# Patient Record
Sex: MALE | Race: WHITE | NOT HISPANIC OR LATINO | URBAN - METROPOLITAN AREA
[De-identification: names, ages, dates, MRNs, and addresses within clinical notes are randomized per-mention and may not be internally consistent; named-entity substitution may affect disease eponyms.]

---

## 2025-04-12 ENCOUNTER — OFFICE VISIT (OUTPATIENT)
Dept: URGENT CARE | Age: 25
End: 2025-04-12

## 2025-04-12 VITALS
DIASTOLIC BLOOD PRESSURE: 71 MMHG | HEART RATE: 71 BPM | BODY MASS INDEX: 31.01 KG/M2 | OXYGEN SATURATION: 98 % | HEIGHT: 73 IN | SYSTOLIC BLOOD PRESSURE: 103 MMHG | TEMPERATURE: 98.2 F | RESPIRATION RATE: 18 BRPM | WEIGHT: 234 LBS

## 2025-04-12 DIAGNOSIS — S05.02XA ABRASION OF LEFT CORNEA, INITIAL ENCOUNTER: Primary | ICD-10-CM

## 2025-04-12 RX ORDER — CIPROFLOXACIN HYDROCHLORIDE 3 MG/ML
2 SOLUTION/ DROPS OPHTHALMIC EVERY 4 HOURS
Qty: 5 ML | Refills: 0 | Status: SHIPPED | OUTPATIENT
Start: 2025-04-12 | End: 2025-04-19

## 2025-04-12 ASSESSMENT — ENCOUNTER SYMPTOMS
PHOTOPHOBIA: 0
FEVER: 0
EYE DISCHARGE: 0
CHILLS: 0
EYE REDNESS: 1

## 2025-04-12 ASSESSMENT — PATIENT HEALTH QUESTIONNAIRE - PHQ9
SUM OF ALL RESPONSES TO PHQ9 QUESTIONS 1 & 2: 0
2. FEELING DOWN, DEPRESSED OR HOPELESS: NOT AT ALL
1. LITTLE INTEREST OR PLEASURE IN DOING THINGS: NOT AT ALL

## 2025-04-12 NOTE — PATIENT INSTRUCTIONS
You have a corneal abrasion.   Please use antibiotics as prescribed.   Please schedule follow up with your eye doctor within 72 hours.   Do not wear contacts until follow up with eye doctor.   You should wear sunglasses when out in the sun.

## 2025-04-12 NOTE — PROGRESS NOTES
"Subjective   Patient ID: Yesika Coy is a 24 y.o. male. They present today with a chief complaint of Eye Problem (Left eye /\"Patient states a bug flew in to eye and he took it out, but wants to get checked to make sure no infected or if something is still in there. Eye was red yesterday\"/Happened yesterday ).    History of Present Illness  -c/o left eye irritation and discomfort x 1 day  -yesterday he put in a new contact and had some irritation   -contact felt dry throughout the day, and he adjusted it numerous times  -later bug flew into his eye but he was able to get it out  -eye was blood shot yesterday, but less today  -eye is still irritated and feels like it has something in it  -he did not put his contacts in today  -denies changes in vision, blurry vision, eye drainage, double vision         Eye Problem  Associated symptoms: redness    Associated symptoms: no discharge and no photophobia        Past Medical History  Allergies as of 04/12/2025    (No Known Allergies)       (Not in a hospital admission)       Past Medical History:   Diagnosis Date    Acute upper respiratory infection, unspecified 06/16/2018    Acute upper respiratory infection    Acute upper respiratory infection, unspecified 06/16/2018    Acute upper respiratory infection    Acute upper respiratory infection, unspecified 01/14/2018    Viral upper respiratory tract infection    Other specified symptoms and signs involving the circulatory and respiratory systems 04/26/2019    Globus sensation    Personal history of other diseases of the nervous system and sense organs 09/24/2016    History of tension headache    Personal history of other diseases of the respiratory system 03/31/2017    History of streptococcal pharyngitis    Personal history of other diseases of the respiratory system 05/29/2019    History of common cold    Personal history of other diseases of the respiratory system 05/29/2019    History of acute pharyngitis    Personal " "history of other diseases of the respiratory system 11/06/2018    History of sore throat    Personal history of other specified conditions 01/14/2018    History of fever       History reviewed. No pertinent surgical history.     reports that he has never smoked. He has never been exposed to tobacco smoke. He has never used smokeless tobacco.    Review of Systems  Review of Systems   Constitutional:  Negative for chills and fever.   Eyes:  Positive for redness. Negative for photophobia, discharge and visual disturbance.        SEE HPI   All other systems reviewed and are negative.        Objective    Vitals:    04/12/25 1136   BP: 103/71   BP Location: Left arm   Patient Position: Sitting   Pulse: 71   Resp: 18   Temp: 36.8 °C (98.2 °F)   SpO2: 98%   Weight: 106 kg (234 lb)   Height: 1.854 m (6' 1\")     No LMP for male patient.    Physical Exam  Vitals reviewed.   Constitutional:       General: He is not in acute distress.     Appearance: Normal appearance. He is not ill-appearing.   HENT:      Head: Normocephalic and atraumatic.   Eyes:      General: Lids are normal. Lids are everted, no foreign bodies appreciated. No allergic shiner.        Left eye: No foreign body, discharge or hordeolum.      Extraocular Movements: Extraocular movements intact.      Left eye: No nystagmus.      Conjunctiva/sclera:      Left eye: Left conjunctiva is injected. No chemosis, exudate or hemorrhage.     Pupils: Pupils are equal, round, and reactive to light.      Left eye: Corneal abrasion and fluorescein uptake present.      Slit lamp exam:     Left eye: No corneal ulcer or foreign body.     Skin:     General: Skin is warm and dry.      Findings: No rash.   Neurological:      Mental Status: He is alert.         Ocular Foreign Body Removal    Date/Time: 4/12/2025 12:01 PM    Performed by: Lupe Mckeon PA-C  Authorized by: Lupe Mckeon PA-C    Consent:     Consent obtained:  Verbal    Consent given by:  Patient    Risks, " benefits, and alternatives were discussed: yes      Risks discussed:  Pain, worsening of condition, globe perforation, incomplete removal, corneal damage, bleeding and infection  Universal protocol:     Procedure explained and questions answered to patient or proxy's satisfaction: yes      Patient identity confirmed:  Verbally with patient  Pre-procedure details:     Fluorescein exam: yes      Fluorescein uptake: yes      Corneal abrasion location:  Inferior  Anesthesia:     Local anesthetic:  Tetracaine drops  Procedure details:     Localization method:  Eyelid eversion, fluorescein, direct visualization and Wood's lamp    Removal mechanism:  Irrigation    Foreign bodies recovered:  None  Post-procedure details:     Confirmation:  No perforation shown by fluorescein and no additional foreign bodies on visualization    Dressing:  Antibiotic drops    Procedure completion:  Tolerated well, no immediate complications      Point of Care Test & Imaging Results from this visit  No results found for this visit on 04/12/25.   Imaging  No results found.    Cardiology, Vascular, and Other Imaging  No other imaging results found for the past 2 days      Diagnostic study results (if any) were reviewed by Lupe Mckeon PA-C.    Assessment/Plan   Allergies, medications, history, and pertinent labs/EKGs/Imaging reviewed by Lupe Mckeon PA-C.     Medical Decision Making  Endorsed left eye irritation since yesterday, started after he changed his contacts, but also had bug fly into his eye.  Having persistent foreign body sensation since then.  Eye exam performed.  No foreign bodies seen.  Fluorescein exam performed, significant for corneal abrasion.  Will start on ciprofloxacin drops.  He was advised to schedule follow up with his eye doctor within 72 hours.  He was advised to not wear his contacts until follow up with eye doctor.  Patient voiced understanding of plan.  Given overall well appearance, vital signs, history, and  exam as above, there is no indication for further emergent testing/intervention at this time.  Advised seeking immediate emergency medical attention if symptoms fail to improve, worsen or any concerning symptoms arise. Patient voiced full understanding and agreement to plan. Discussed with the patient our clinical thoughts at this time given the above findings and clinical assessment and we had a shared decision-making conversation in a patient-centered decision-making model on how to proceed forward. The patient was instructed on the importance of a close follow-up with PCP and other care providers. The patient was also advised that an Urgent care diagnosis is often a preliminary impression and that definitive care is often not able to be given completley in the Urgent care setting.    Orders and Diagnoses  Diagnoses and all orders for this visit:  Abrasion of left cornea, initial encounter  -     Ocular Foreign Body Removal  -     ciprofloxacin (Ciloxan) 0.3 % ophthalmic solution; Administer 2 drops into the left eye every 4 hours for 7 days.      Medical Admin Record      Patient disposition: Home    Electronically signed by Lupe Mckeon PA-C  1:07 PM

## 2025-08-26 ENCOUNTER — OFFICE VISIT (OUTPATIENT)
Dept: URGENT CARE | Age: 25
End: 2025-08-26

## 2025-08-26 VITALS
HEIGHT: 73 IN | OXYGEN SATURATION: 97 % | BODY MASS INDEX: 33.13 KG/M2 | DIASTOLIC BLOOD PRESSURE: 78 MMHG | HEART RATE: 86 BPM | WEIGHT: 250 LBS | SYSTOLIC BLOOD PRESSURE: 115 MMHG | TEMPERATURE: 98.8 F | RESPIRATION RATE: 18 BRPM

## 2025-08-26 DIAGNOSIS — S01.502A: Primary | ICD-10-CM

## 2025-08-26 DIAGNOSIS — K12.0 APHTHOUS ULCER OF MOUTH: ICD-10-CM

## 2025-08-26 PROCEDURE — 1036F TOBACCO NON-USER: CPT | Performed by: FAMILY MEDICINE

## 2025-08-26 PROCEDURE — 3008F BODY MASS INDEX DOCD: CPT | Performed by: FAMILY MEDICINE

## 2025-08-26 PROCEDURE — 99213 OFFICE O/P EST LOW 20 MIN: CPT | Performed by: FAMILY MEDICINE

## 2025-08-26 ASSESSMENT — PAIN SCALES - GENERAL: PAINLEVEL_OUTOF10: 3
